# Patient Record
(demographics unavailable — no encounter records)

---

## 2018-04-25 NOTE — DI
EXAM:  LEFT KNEE. 

  

HISTORY: Knee pain 

  

FINDINGS:  Left knee four view.  There is moderate osteoarthritis of the medial joint compartment.  T
he lateral and anterior compartments are grossly within normal limits.  General bone density appears 
normal.  There is no fracture or visible joint effusion. 

  

  

IMPRESSION: 

Moderate medial compartment osteoarthritis.

## 2018-04-25 NOTE — DI
EXAM:  Radiographs, right knee 

  

HISTORY:  Right knee pain. 

  

COMPARISON:  None available. 

  

TECHNIQUE:  Four views. 

  

  

FINDINGS:  Bone mineralization is normal.  No fracture or dislocation identified.  There is moderate 
medial compartment joint space narrowing with associated marginal osteophyte formation.  Moderate pat
ellofemoral compartment marginal osteophyte formation also present.  More mild changes seen in the la
teral compartment.  No erosions are seen.  Soft tissues are unremarkable. 

  

------------------------------ 

IMPRESSION: 

Moderate osteoarthritis.